# Patient Record
Sex: MALE | Race: WHITE
[De-identification: names, ages, dates, MRNs, and addresses within clinical notes are randomized per-mention and may not be internally consistent; named-entity substitution may affect disease eponyms.]

---

## 2022-05-03 PROBLEM — Z00.00 ENCOUNTER FOR PREVENTIVE HEALTH EXAMINATION: Status: ACTIVE | Noted: 2022-05-03

## 2022-05-25 ENCOUNTER — APPOINTMENT (OUTPATIENT)
Dept: UROLOGY | Facility: CLINIC | Age: 42
End: 2022-05-25
Payer: COMMERCIAL

## 2022-05-25 ENCOUNTER — NON-APPOINTMENT (OUTPATIENT)
Age: 42
End: 2022-05-25

## 2022-05-25 VITALS
BODY MASS INDEX: 24.34 KG/M2 | WEIGHT: 170 LBS | HEIGHT: 70 IN | TEMPERATURE: 98 F | SYSTOLIC BLOOD PRESSURE: 117 MMHG | DIASTOLIC BLOOD PRESSURE: 84 MMHG | HEART RATE: 84 BPM | OXYGEN SATURATION: 98 %

## 2022-05-25 DIAGNOSIS — I86.1 SCROTAL VARICES: ICD-10-CM

## 2022-05-25 DIAGNOSIS — Z78.9 OTHER SPECIFIED HEALTH STATUS: ICD-10-CM

## 2022-05-25 DIAGNOSIS — N46.9 MALE INFERTILITY, UNSPECIFIED: ICD-10-CM

## 2022-05-25 PROCEDURE — 99204 OFFICE O/P NEW MOD 45 MIN: CPT

## 2022-05-25 NOTE — PHYSICAL EXAM
[Abdomen Soft] : soft [Abdomen Tenderness] : non-tender [Costovertebral Angle Tenderness] : no ~M costovertebral angle tenderness [Urethral Meatus] : meatus normal [Urinary Bladder Findings] : the bladder was normal on palpation [Scrotum] : the scrotum was normal [Testes Tenderness] : no tenderness of the testes [Testes Mass (___cm)] : there were no testicular masses [No Prostate Nodules] : no prostate nodules [FreeTextEntry1] : left epididymal cyst; ? bilateral varicocele; vas x 2

## 2022-05-25 NOTE — LETTER BODY
[FreeTextEntry2] : Errol Driver MD\par [address]\par [place], [place] [address]\par  [FreeTextEntry1] : Dear Doctor,\par \par Thank you for your kind referral.  I am enclosing a copy of my office note for your information.\par \par I will keep you informed of any developments.\par \par Feel free to contact me if you have any questions.\par \par Sincerely,\par \par Malick Alcaraz MD, FACS\par Professor of Urology\par [facility]\par \par [address]\par [place], [place] [address]\par \par [address]\[place], [place] [address]\par \par Office Telephone \par [phone]\par \par Fax\par [phone]\par \par \par  [DrAbhinav  ___] : Dr. MARTINEZ

## 2022-05-25 NOTE — ASSESSMENT
[FreeTextEntry1] :  Miko Jones is a 41 year old  \par His wife is  Christine Bynum is  41.  They have been  x 15 years and having unprotected sexual intercourse for 5 years timed to ovulation x 5 months.  Christine had a  miscarriage at 6 weeks (2 years ago) and a  ETOP 15 years ago.  They have used Artificial lubricants occasionally and  recently utilized "sperm friendly"\par Exposure history: no tobacco; occ ETOh; no recreational drugs\par \par Reid has had 3 semen analysis \par 4.11.22\par volume 6.5 cc\par [34]\par 221 tc\par 12% motility\par 5% morphology\par \par 2/18/22\par 6.2 cc\par [10]\par 11%\par 3% morphology\par \par 1/28/2022\par vol 6.5\par [20]\par 3% motility\par 3% morphology\par \par He underwent ultrasound at [facility] demonstrating a  left varicocele and a caput epididymal cyst (left)\par They have seen Dr Nii Zavaleta at [facility]\par Options of IUI and IVF had been reviewed,\par They are not willing or not able to afford IVF and wish to explore options.\par The findings of a varicocele were discussed with the patient.\par  Discussed indications for varicocelectomy:\par 1, fertility,\par 2. ? development of hypogonadism\par 3. Cosmesis\par 4. Pain\par We discussed his low normal T levels and the findings of left varicocele and ? right varicocele\par In light of their ages, I encouraged him (them) to proceed with IVF.  We discussed \par The findings of a varicocele were discussed with  REID JONES.\par The pathophysiology was discussed.\par The surgical procedure of microsurgical varicocelectomy was fully discussed. Risks and complications were reviewed including but not limited to: 1. recurrence, 2. chronic testicular pain/discomfort, 3. hydrocele (temporary or permanent), 4. testicular injury or atrophy, 5 vas or nerve injury, 6. infection, 7. bleeding ,  8. general surgical and anesthetic risks etc.\par Operative photographs were demonstrated\par \par Plan:\par 1. recommend IVF\par 2. consider varicocelectomy for fertility and ? impact on low normal T \par 3. recommend repeat T and repeat ultrasound if they not proceeding with IVF\par \par \par \par \par \par

## 2022-05-25 NOTE — HISTORY OF PRESENT ILLNESS
[None] : no symptoms [FreeTextEntry1] : Patient Miko Jones\par Job: \par Partner Name: Christine Bynum\par Partner Age: 41\par Prior marriage: none\par Prior Pregnancy miscarriage at 6 weeks (2 years ago); ETOP 15 years ago\par Duration of Relationship: 15 years\par Years unprotected sexual intercourse: 5 years \par Time [place]: x 6 months\par Sexual dysfunction: none\par Artificial lubricants: occassional use; recently utilized "sperm friendly"\par Exposure history: no tobacco; occ ETOh; no recreational drugsh\par  [Erectile Dysfunction] : no Erectile Dysfunction

## 2022-05-26 LAB
ESTRADIOL SERPL-MCNC: 27
FSH SERPL-MCNC: 4
LH SERPL-ACNC: 4.8
PROLACTIN SERPL-MCNC: 4.5
T4 SERPL-MCNC: 7.3
TESTOST FREE+TOTAL PANEL SERPL-MCNC: 366
TSH SERPL-ACNC: 2.11